# Patient Record
Sex: FEMALE | Race: WHITE
[De-identification: names, ages, dates, MRNs, and addresses within clinical notes are randomized per-mention and may not be internally consistent; named-entity substitution may affect disease eponyms.]

---

## 2018-11-02 ENCOUNTER — HOSPITAL ENCOUNTER (OUTPATIENT)
Age: 18
Discharge: HOME | End: 2018-11-02

## 2018-11-02 ENCOUNTER — HOSPITAL ENCOUNTER (OUTPATIENT)
Dept: HOSPITAL 91 - LAB | Age: 18
Discharge: HOME | End: 2018-11-02
Payer: COMMERCIAL
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
## 2018-11-02 DIAGNOSIS — E03.9: ICD-10-CM

## 2018-11-02 DIAGNOSIS — S89.92XD: ICD-10-CM

## 2018-11-02 DIAGNOSIS — E55.9: Primary | ICD-10-CM

## 2018-11-02 DIAGNOSIS — E78.5: ICD-10-CM

## 2018-11-02 LAB
ADD MAN DIFF?: NO
ALANINE AMINOTRANSFERASE: 15 IU/L (ref 13–69)
ALBUMIN/GLOBULIN RATIO: 1.5
ALBUMIN: 5.1 G/DL (ref 3.3–4.9)
ALKALINE PHOSPHATASE: 98 IU/L (ref 42–121)
ANION GAP: 12 (ref 5–13)
ASPARTATE AMINO TRANSFERASE: 30 IU/L (ref 15–46)
BASOPHIL #: 0 10^3/UL (ref 0–0.1)
BASOPHILS %: 0.5 % (ref 0–2)
BILIRUBIN,DIRECT: 0 MG/DL (ref 0–0.2)
BILIRUBIN,TOTAL: 0.3 MG/DL (ref 0.2–1.3)
BLOOD UREA NITROGEN: 10 MG/DL (ref 7–20)
CALCIUM: 9.9 MG/DL (ref 8.4–10.2)
CARBON DIOXIDE: 24 MMOL/L (ref 21–31)
CHLORIDE: 106 MMOL/L (ref 97–110)
CHOL/HDL RATIO: 3.4 RATIO
CHOLESTEROL: 193 MG/DL (ref 85–185)
CREATININE: 0.68 MG/DL (ref 0.44–1)
EOSINOPHILS #: 0.1 10^3/UL (ref 0–0.5)
EOSINOPHILS %: 2 % (ref 0–7)
GLOBULIN: 3.4 G/DL (ref 1.3–3.2)
GLUCOSE: 93 MG/DL (ref 70–220)
HDL CHOLESTEROL: 56 MG/DL (ref 33–83)
HEMATOCRIT: 39.1 % (ref 37–47)
HEMOGLOBIN A1C: 4.7 % (ref 0–5.9)
HEMOGLOBIN: 13.7 G/DL (ref 12–16)
IMMATURE GRANS #M: 0.02 10^3/UL (ref 0–0.03)
IMMATURE GRANS % (M): 0.3 % (ref 0–0.43)
LDL CHOLESTEROL,CALCULATED: 109 MG/DL
LYMPHOCYTES #: 2.4 10^3/UL (ref 0.8–2.9)
LYMPHOCYTES %: 37.3 % (ref 18–55)
MEAN CORPUSCULAR HEMOGLOBIN: 31.9 PG (ref 29–33)
MEAN CORPUSCULAR HGB CONC: 35 G/DL (ref 32–37)
MEAN CORPUSCULAR VOLUME: 90.9 FL (ref 72–104)
MEAN PLATELET VOLUME: 9.2 FL (ref 7.4–10.4)
MONOCYTE #: 0.5 10^3/UL (ref 0.3–0.9)
MONOCYTES %: 7 % (ref 0–13)
NEUTROPHIL #: 3.4 10^3/UL (ref 1.6–7.5)
NEUTROPHILS %: 52.9 % (ref 30–74)
NUCLEATED RED BLOOD CELLS #: 0 10^3/UL (ref 0–0)
NUCLEATED RED BLOOD CELLS%: 0 /100WBC (ref 0–0)
PLATELET COUNT: 288 10^3/UL (ref 140–415)
POTASSIUM: 4.1 MMOL/L (ref 3.5–5.1)
RED BLOOD COUNT: 4.3 10^6/UL (ref 4.2–5.4)
RED CELL DISTRIBUTION WIDTH: 11.8 % (ref 11.5–14.5)
SODIUM: 142 MMOL/L (ref 135–144)
T4 (THYROXINE): 11.4 UG/DL (ref 5.5–11)
THYROID STIMULATING HORMONE: 2.06 MIU/L (ref 0.47–4.68)
TOTAL PROTEIN: 8.5 G/DL (ref 6.1–8.1)
TRIGLYCERIDES: 138 MG/DL (ref 0–149)
WHITE BLOOD COUNT: 6.4 10^3/UL (ref 4.8–10.8)

## 2018-11-02 PROCEDURE — 73560 X-RAY EXAM OF KNEE 1 OR 2: CPT

## 2018-11-02 PROCEDURE — 85025 COMPLETE CBC W/AUTO DIFF WBC: CPT

## 2018-11-02 PROCEDURE — 84436 ASSAY OF TOTAL THYROXINE: CPT

## 2018-11-02 PROCEDURE — 84443 ASSAY THYROID STIM HORMONE: CPT

## 2018-11-02 PROCEDURE — 82306 VITAMIN D 25 HYDROXY: CPT

## 2018-11-02 PROCEDURE — 83036 HEMOGLOBIN GLYCOSYLATED A1C: CPT

## 2018-11-02 PROCEDURE — 80053 COMPREHEN METABOLIC PANEL: CPT

## 2018-11-02 PROCEDURE — 80061 LIPID PANEL: CPT

## 2019-02-01 ENCOUNTER — HOSPITAL ENCOUNTER (OUTPATIENT)
Dept: HOSPITAL 91 - HKI | Age: 19
Discharge: HOME | End: 2019-02-01
Payer: COMMERCIAL

## 2019-02-01 DIAGNOSIS — M25.562: Primary | ICD-10-CM

## 2019-03-05 ENCOUNTER — HOSPITAL ENCOUNTER (OUTPATIENT)
Dept: HOSPITAL 10 - SDS | Age: 19
Discharge: HOME | End: 2019-03-05
Attending: ORTHOPAEDIC SURGERY
Payer: COMMERCIAL

## 2019-03-05 ENCOUNTER — HOSPITAL ENCOUNTER (OUTPATIENT)
Dept: HOSPITAL 91 - SDS | Age: 19
Discharge: HOME | End: 2019-03-05
Payer: COMMERCIAL

## 2019-03-05 VITALS — SYSTOLIC BLOOD PRESSURE: 110 MMHG | DIASTOLIC BLOOD PRESSURE: 65 MMHG | RESPIRATION RATE: 20 BRPM | HEART RATE: 86 BPM

## 2019-03-05 VITALS — DIASTOLIC BLOOD PRESSURE: 49 MMHG | RESPIRATION RATE: 17 BRPM | SYSTOLIC BLOOD PRESSURE: 102 MMHG | HEART RATE: 84 BPM

## 2019-03-05 VITALS — HEART RATE: 84 BPM | SYSTOLIC BLOOD PRESSURE: 113 MMHG | RESPIRATION RATE: 17 BRPM | DIASTOLIC BLOOD PRESSURE: 58 MMHG

## 2019-03-05 VITALS
HEIGHT: 59 IN | HEIGHT: 59 IN | WEIGHT: 132.94 LBS | BODY MASS INDEX: 26.8 KG/M2 | BODY MASS INDEX: 26.8 KG/M2 | WEIGHT: 132.94 LBS

## 2019-03-05 VITALS — SYSTOLIC BLOOD PRESSURE: 111 MMHG | DIASTOLIC BLOOD PRESSURE: 63 MMHG | RESPIRATION RATE: 14 BRPM | HEART RATE: 84 BPM

## 2019-03-05 VITALS — DIASTOLIC BLOOD PRESSURE: 58 MMHG | RESPIRATION RATE: 21 BRPM | SYSTOLIC BLOOD PRESSURE: 109 MMHG | HEART RATE: 72 BPM

## 2019-03-05 VITALS — DIASTOLIC BLOOD PRESSURE: 56 MMHG | RESPIRATION RATE: 17 BRPM | SYSTOLIC BLOOD PRESSURE: 107 MMHG | HEART RATE: 72 BPM

## 2019-03-05 VITALS — RESPIRATION RATE: 18 BRPM | HEART RATE: 80 BPM | DIASTOLIC BLOOD PRESSURE: 59 MMHG | SYSTOLIC BLOOD PRESSURE: 110 MMHG

## 2019-03-05 VITALS — DIASTOLIC BLOOD PRESSURE: 67 MMHG | SYSTOLIC BLOOD PRESSURE: 131 MMHG | RESPIRATION RATE: 21 BRPM

## 2019-03-05 VITALS — DIASTOLIC BLOOD PRESSURE: 75 MMHG | HEART RATE: 96 BPM | SYSTOLIC BLOOD PRESSURE: 125 MMHG | RESPIRATION RATE: 18 BRPM

## 2019-03-05 VITALS — DIASTOLIC BLOOD PRESSURE: 57 MMHG | RESPIRATION RATE: 22 BRPM | HEART RATE: 72 BPM | SYSTOLIC BLOOD PRESSURE: 109 MMHG

## 2019-03-05 VITALS — DIASTOLIC BLOOD PRESSURE: 78 MMHG | HEART RATE: 101 BPM | SYSTOLIC BLOOD PRESSURE: 126 MMHG | RESPIRATION RATE: 20 BRPM

## 2019-03-05 VITALS — RESPIRATION RATE: 21 BRPM | DIASTOLIC BLOOD PRESSURE: 67 MMHG | HEART RATE: 91 BPM | SYSTOLIC BLOOD PRESSURE: 137 MMHG

## 2019-03-05 VITALS — DIASTOLIC BLOOD PRESSURE: 54 MMHG | SYSTOLIC BLOOD PRESSURE: 125 MMHG | HEART RATE: 82 BPM | RESPIRATION RATE: 14 BRPM

## 2019-03-05 VITALS — SYSTOLIC BLOOD PRESSURE: 115 MMHG | DIASTOLIC BLOOD PRESSURE: 48 MMHG | HEART RATE: 80 BPM | RESPIRATION RATE: 14 BRPM

## 2019-03-05 VITALS — HEART RATE: 80 BPM | RESPIRATION RATE: 20 BRPM | DIASTOLIC BLOOD PRESSURE: 59 MMHG | SYSTOLIC BLOOD PRESSURE: 108 MMHG

## 2019-03-05 DIAGNOSIS — S83.512D: Primary | ICD-10-CM

## 2019-03-05 DIAGNOSIS — S83.282D: ICD-10-CM
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
## 2019-03-05 DIAGNOSIS — S83.242D: ICD-10-CM

## 2019-03-05 PROCEDURE — 29888 ARTHRS AID ACL RPR/AGMNTJ: CPT

## 2019-03-05 PROCEDURE — C1713 ANCHOR/SCREW BN/BN,TIS/BN: HCPCS

## 2019-03-05 PROCEDURE — 29880 ARTHRS KNE SRG MNISECTMY M&L: CPT

## 2019-03-05 RX ADMIN — FENTANYL CITRATE 1 MCG: 50 INJECTION, SOLUTION INTRAMUSCULAR; INTRAVENOUS at 10:28

## 2019-03-05 RX ADMIN — SODIUM CHLORIDE PRN MCG: 9 INJECTION, SOLUTION INTRAVENOUS at 10:55

## 2019-03-05 RX ADMIN — SODIUM CHLORIDE PRN MCG: 9 INJECTION, SOLUTION INTRAVENOUS at 10:34

## 2019-03-05 RX ADMIN — SODIUM CHLORIDE PRN MCG: 9 INJECTION, SOLUTION INTRAVENOUS at 10:28

## 2019-03-05 RX ADMIN — SODIUM CHLORIDE PRN MCG: 9 INJECTION, SOLUTION INTRAVENOUS at 10:21

## 2019-03-05 RX ADMIN — FENTANYL CITRATE 1 MCG: 50 INJECTION, SOLUTION INTRAMUSCULAR; INTRAVENOUS at 10:21

## 2019-03-05 RX ADMIN — FENTANYL CITRATE 1 MCG: 50 INJECTION, SOLUTION INTRAMUSCULAR; INTRAVENOUS at 10:55

## 2019-03-05 RX ADMIN — FENTANYL CITRATE 1 MCG: 50 INJECTION, SOLUTION INTRAMUSCULAR; INTRAVENOUS at 10:34

## 2019-03-05 RX ADMIN — MEPERIDINE HYDROCHLORIDE 1 MG: 25 INJECTION, SOLUTION INTRAMUSCULAR; INTRAVENOUS; SUBCUTANEOUS at 10:22

## 2019-03-05 RX ADMIN — ONDANSETRON HYDROCHLORIDE 1 MG: 2 INJECTION, SOLUTION INTRAMUSCULAR; INTRAVENOUS at 10:22

## 2019-03-05 NOTE — PAC
Date/Time of Note


Date/Time of Note


DATE: 3/5/19 


TIME: 11:08





Post-Anesthesia Notes


Post-Anesthesia Note


Last documented vital signs





Vital Signs


  Date      Temp  Pulse  Resp  B/P (MAP)   Pulse Ox  O2          O2 Flow    FiO2


Time                                                 Delivery    Rate


    3/5/19           72    21      109/58        99  Room Air


     10:48                           (75)


    3/5/19  98.8


     10:19





Activity:  WNL


Respiratory function:  WNL


Cardiovascular function:  WNL


Mental status:  Baseline


Pain reasonably controlled:  Yes


Hydration appropriate:  Yes


Nausea/Vomiting absent:  Yes











GARTH PATEL MD                Mar 5, 2019 11:08

## 2019-03-05 NOTE — OPR
DATE OF OPERATION:  03/05/2019

 

 

PREOPERATIVE DIAGNOSES:  

1.  Left anterior cruciate ligament rupture.

2.  Left medial meniscus tear.

 

POSTOPERATIVE DIAGNOSES:

1.  Left anterior cruciate ligament rupture.

2.  Left medial meniscus tear.

3.  Left lateral meniscus tear.

 

OPERATION:

1.  Diagnostic arthroscopy, left knee.

2.  Left anterior cruciate ligament reconstruction with hamstring autograft.

3.  Left partial medial meniscectomy.

4.  Left partial lateral meniscectomy.

 

ANESTHESIA:  General plus regional femoral nerve block.

 

ANESTHESIOLOGIST:  Dr. Hoang.

 

TOURNIQUET TIME:  93 minutes.

 

BLOOD LOSS:  Less than 50 mL.

 

COMPLICATIONS:  None.

 

CONDITION:  To PACU stable.

 

INDICATIONS:  This is a 18-year-old female who injured her left knee playing soccer quite some time a
go.  She had persistent pain and instability and MRI revealed ACL rupture, as well as medial meniscus
 tear.  Recommendation was made for operative treatment.  All risks, benefits and alternatives to the
 procedure were thoroughly discussed with family and they wished to proceed.

 

PROCEDURE:  The patient was brought to the operating room and given a general anesthetic by the claudio
hesiologist.  IV Ancef was administered.  Dr. Hoang performed a regional femoral nerve block.  A tourni
quet was then applied to the left thigh and the left leg was placed into the arthroscopic leg landers.
  The right leg was placed into a padded well leg landers.  The left lower extremity was then prepped 
and draped in the standard orthopedic fashion.

 

Esmarch was used to exsanguinate the limb and the tourniquet was then elevated to 250 mmHg.  A longit
udinal incision was made centered between the tibial tubercle and medial flare of the tibia.  Initial
 incision was made with a scalpel and Bovie cautery used for hemostasis.  Blunt dissection was taken 
down to the sartorius fascia, which was then sharply incised.  The gracilis and semitendinosus tendon
s were each identified and isolated using a right-angle clamp.  They were each sharply removed from t
heir tibial tubercle attachment and a whip knot placed at the end of each.  The soft tissue attachmen
ts were cleared using blunt finger dissection under direct visualization.  The tendon stripper was th
en used to remove the 2 tendons in their entirety.  They were placed on the back table for preparatio
n.  The grafts were prepared by removing all the muscular tissue using a Key elevator.  A whip stitch
 was then used to tubularize the graft with FiberLoop.  The grafts together were then sized to approx
imately 7 mm.

 

The knee was insufflated with 30 mL of fluid and a standard anterolateral portal was then made.  The 
scope was inserted.  Diagnostic arthroscopy was performed.  Patellofemoral compartment was intact.  I
n the intercondylar notch, there was complete rupture of the ACL with extensive synovitis.  The PCL w
as intact.  In the lateral compartment, there was a small lateral meniscus tear of the mid body.  In 
the medial compartment, there was a meniscus tear of the mid body extending back toward the posterior
 horn.  There was also extensive irregularity of the medial femoral condyle with some cartilage loss 
and fraying.  There were also a few small loose bodies noted.  Under direct visualization, a standard
 anteromedial portal was then made and the shaver was used to debride the ACL remnants, surrounding s
ynovitis and loose bodies.  The shaver was then also used to debride the lateral meniscus tear and me
dial meniscus tear in combination with the Arthrocare wand for Coblation.  The remaining meniscus was
 quite stable upon probing.  The Arthrocare wand and shaver were also used on the femoral condyle def
ect for chondroplasty.  The Arthrocare wand and shaver were used to completely remove the ACL remnant
s and then the bone cutting shaver was used to perform a notchplasty.  The graft was sized to 7 mm on
 the back table and the gold tibial guide was then placed through the medial portal and longitudinal 
incision.  The tibial guide pin was advanced and felt to be in good position.  The 7 mm cigar reamer 
was used to ream the tibial tunnel.  The 5-mm over-the-top guide was placed through the tibial tunnel
 and hooked onto the back wall of the femur with the knee held in 90 degrees of flexion.  The Beath p
in was then passed through the over-the-top guide and exited out through the skin where it was graspe
d with a Kocher.  The Endobutton drill was used to drill the femoral tunnel, followed by the 7 mm aco
rn reamer to ream a 35 mm femoral tunnel.  A 7 mm dilator was used in both tunnels and the Beath pin 
was then exchanged for a suture.  The Endobutton depth gauge revealed a 42 mm tunnel and therefore, a
 15 mm Endobutton was selected.  The graft was placed through the Endobutton on the back table and st
ill sized at 7 mm.  It was placed on the Graftmaster in tension and marked for passage.  The graft wa
s then passed through the tibial and femoral tunnel and the Endobutton toggled appropriately.  The gr
aft was secure on pullback from the tibial side.  With the graft held in tension, the knee was taken 
through several cycles of range of motion.  The graft was then secured with an 8 x 20 mm bio-absorbab
le interference screw.  This provided stable Lachman exam.  The excess tendon was excised and the timur
gitudinal incision irrigated and closed using 0 Vicryl, 2-0 Vicryl, 3-0 Vicryl and 3-0 Monocryl.  The
 portals were also closed using 3-0 Monocryl.  Mastisol and Steri-Strips were applied to all incision
s.  A dry sterile dressing of 4 x 4's, Kerlix, and a 6-inch Ace bandage were then applied.  The tourn
iquet was released after 93 minutes.  She was placed into a hinged knee range of motion brace and sduhakar
kened and taken to recovery room in stable condition.  There were no immediate intraoperative or post
operative complications.

 

 

Dictated By: TAMY HARRIS/GURDEEP

DD:    03/05/2019 10:23:38

DT:    03/05/2019 12:34:30

Conf#: [id number]

DID#:  [id number]

## 2019-03-05 NOTE — PREAC
Date/Time of Note


Date/Time of Note


DATE: 3/5/19 


TIME: 07:17





Anesthesia Eval and Record


Evaluation


Time Pre-Procedure Interview


DATE: 3/5/19 


TIME: 07:17


Age


18


Sex


female


NPO:  8 hrs


Preoperative diagnosis


Left ACL rupture, medial meniscus tear


Planned procedure


Left knee operative arthroscopy, ACL reconstruction, medial meniscus repair vs 


partial meniscectomy





Past Medical History


Past Medical History:  None





Surgery & Anesthesia Issues


No known issue





Meds


Anticoagulation:  No


Beta Blocker within 24 hr:  No


Reason Beta Blocker not given:  Pt. not on B-Blocker





Current Medications


Lactated Ringer's 1,000 ml @  100 mls/hr Q10H IV* ;  Start 3/5/19 at 06:00;  


Stop 3/5/19 at 15:59


Meds reviewed:  Yes





Allergies


Coded Allergies:  


     ibuprofen (Unverified  Allergy, Unknown, 3/4/19)


Allergies Reviewed:  Yes





Labs/Studies


Labs Reviewed:  Reviewed by anesthesiologist


Pregnancy test:  Negative





Pre-procedure Exam


Airway:  Adequate mouth opening


Mallampati:  Mallampati I


Teeth:  Normal


Lung:  Normal


Heart:  Normal





ASA Physical Status


ASA physical status:  1


Emergency:  None





Planned Anesthetic


General/MAC:  LMA





Planned Pain Management


Single shot nerve block, Parenteral pain med





Pre-operative Attestations


Prior to commencing anesthesia and surgery, the patient was re-evaluated, there 


was verification of:


*The patient's identity


*The results of appropriate recent lab work and preoperative vital signs


*The above evaluation not changing prior to induction


*Anesthetic plan, risk benefits, alternative and complications discussed with 


patient/family; questions answered; patient/family understands, accepts and 


wishes to proceed.











GARTH PATEL MD                Mar 5, 2019 07:17

## 2019-03-05 NOTE — HPN
Date/Time of Note


Date/Time of Note


DATE: 3/5/19 


TIME: 07:34





Interval H&P Admission Note


Pt. seen H&P reviewed:  No system changes











TAMY LOWE MD           Mar 5, 2019 07:34

## 2019-03-05 NOTE — OPPN
Date/Time of Note


Date/Time of Note


DATE: 3/5/19 


TIME: 10:15





Operative Report


Preoperative Diagnosis


LEFT ACL rupture, medial meniscus tear


Postoperative Diagnosis


same plus lateral meniscus tear


Operation/Procedure Performed


Left knee arthroscopy, ACL reconstruction with hamstring autograft, partial 


medial & lateral meniscectomy


Surgeon


see signature line


First assist


none


Anesthesia:  general, other


Estimated blood loss:  10 - 50 ml's


Transfusion Required


   none


Specimen


none


Grafts/Implants


none


Complications


none











TAMY LOWE MD           Mar 5, 2019 10:16